# Patient Record
(demographics unavailable — no encounter records)

---

## 2024-10-11 NOTE — HISTORY OF PRESENT ILLNESS
[FreeTextEntry1] :  This is a pleasant 67 yr old F seen today for a follow up of Type 2 DM  At the previous visit, she was prescribed Ozempic, however, she states that she took one dose and it increased her blood pressure.  She was seen by the cardiologist.  No changes were made to her blood pressure medications, and she was told to continue the Ozempic  Patient states that she does not want to continue taking the Ozempic.   1

## 2024-10-11 NOTE — HISTORY OF PRESENT ILLNESS
[FreeTextEntry1] :  This is a pleasant 67 yr old F seen today for a follow up of Type 2 DM  At the previous visit, she was prescribed Ozempic, however, she states that she took one dose and it increased her blood pressure.  She was seen by the cardiologist.  No changes were made to her blood pressure medications, and she was told to continue the Ozempic  Patient states that she does not want to continue taking the Ozempic.

## 2024-10-11 NOTE — PHYSICAL EXAM
[Alert] : alert [Normal Sclera/Conjunctiva] : normal sclera/conjunctiva [Normal Outer Ear/Nose] : the ears and nose were normal in appearance [No Neck Mass] : no neck mass was observed [No Respiratory Distress] : no respiratory distress [Normal PMI] : the apical impulse was normal [Normal Bowel Sounds] : normal bowel sounds

## 2024-10-30 NOTE — HISTORY OF PRESENT ILLNESS
[FreeTextEntry1] :  This is a pleasant 67 yr old F seen today for a follow up of Type 2 DM  At the initial visit, she was prescribed Ozempic, however, she states that she took one dose and it increased her blood pressure.  She was seen by the cardiologist.  No changes were made to her blood pressure medications, and she was told to continue the Ozempic  Patient states that she does not want to continue taking the Ozempic. At the previous visit, she was started on Jardiance 10 mg once daily and she states that she started taking it about 1 week ago. She is also taking Metformin 500 mg BID

## 2024-10-30 NOTE — PHYSICAL EXAM
[Alert] : alert [Normal Sclera/Conjunctiva] : normal sclera/conjunctiva [Normal Outer Ear/Nose] : the ears and nose were normal in appearance [No Neck Mass] : no neck mass was observed [No Respiratory Distress] : no respiratory distress [Normal Bowel Sounds] : normal bowel sounds [No Stigmata of Cushings Syndrome] : no stigmata of Cushings Syndrome [Oriented x3] : oriented to person, place, and time

## 2025-01-16 NOTE — HISTORY OF PRESENT ILLNESS
[FreeTextEntry1] :  This is a pleasant 67 yr old F seen today for a follow up of Type 2 DM  At the initial visit, she was prescribed Ozempic, however, she states that she took one dose and it increased her blood pressure.  She was seen by the cardiologist.  No changes were made to her blood pressure medications, and she was told to continue the Ozempic  Patient states that she does not want to continue taking the Ozempic. At the previous visit, she was started on Jardiance 10 mg once daily and she states that she started taking it about 1 week ago. She is also taking Metformin 500 mg BID  She reports no new concerns today with the medications

## 2025-01-16 NOTE — PHYSICAL EXAM
[Alert] : alert [Normal Sclera/Conjunctiva] : normal sclera/conjunctiva [Normal Outer Ear/Nose] : the ears and nose were normal in appearance [No Neck Mass] : no neck mass was observed [No Respiratory Distress] : no respiratory distress [Normal PMI] : the apical impulse was normal [Normal Bowel Sounds] : normal bowel sounds [No CVA Tenderness] : no ~M costovertebral angle tenderness [No Stigmata of Cushings Syndrome] : no stigmata of Cushings Syndrome [No Rash] : no rash [Cranial Nerves Intact] : cranial nerves 2-12 were intact [Oriented x3] : oriented to person, place, and time

## 2025-05-05 NOTE — HISTORY OF PRESENT ILLNESS
[FreeTextEntry1] : This is a pleasant 67 yr old F seen today for a follow up of Type 2 DM  At the initial visit, she was prescribed Ozempic, however, she states that she took one dose and it increased her blood pressure.  She was seen by the cardiologist.  No changes were made to her blood pressure medications, and she was told to continue the Ozempic  Patient states that she does not want to continue taking the Ozempic. At the previous visit, she was started on Jardiance 10 mg once daily and she states that she started taking it about 1 week ago. She is also taking Metformin 500 mg BID but mostly just takes it once a day  - Patient states that she stays active and walks regularly. She reports no new concerns today with the medications